# Patient Record
Sex: FEMALE | Race: ASIAN | URBAN - METROPOLITAN AREA
[De-identification: names, ages, dates, MRNs, and addresses within clinical notes are randomized per-mention and may not be internally consistent; named-entity substitution may affect disease eponyms.]

---

## 2022-08-03 ENCOUNTER — OFFICE VISIT (OUTPATIENT)
Dept: URBAN - METROPOLITAN AREA CLINIC 43 | Facility: CLINIC | Age: 42
End: 2022-08-03
Payer: COMMERCIAL

## 2022-08-03 DIAGNOSIS — H52.13 MYOPIA, BILATERAL: ICD-10-CM

## 2022-08-03 DIAGNOSIS — H16.223 KERATOCONJUNCTIVITIS SICCA, BILATERAL: Primary | ICD-10-CM

## 2022-08-03 PROCEDURE — 92004 COMPRE OPH EXAM NEW PT 1/>: CPT | Performed by: OPTOMETRIST

## 2022-08-03 ASSESSMENT — VISUAL ACUITY
OS: 20/20
OD: 20/20

## 2022-08-03 ASSESSMENT — KERATOMETRY
OS: 45.00
OD: 45.13

## 2022-08-03 ASSESSMENT — INTRAOCULAR PRESSURE
OD: 13
OS: 14

## 2022-08-03 NOTE — IMPRESSION/PLAN
Impression: Keratoconjunctivitis sicca, bilateral: J16.400. Plan: Recommend artificial tears at least 4 times a day and gel drop or tear ointment at bedtime.